# Patient Record
Sex: FEMALE | Race: WHITE | NOT HISPANIC OR LATINO | Employment: STUDENT | URBAN - METROPOLITAN AREA
[De-identification: names, ages, dates, MRNs, and addresses within clinical notes are randomized per-mention and may not be internally consistent; named-entity substitution may affect disease eponyms.]

---
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
## 2024-01-08 ENCOUNTER — TELEPHONE (OUTPATIENT)
Dept: OBGYN CLINIC | Facility: CLINIC | Age: 19
End: 2024-01-08

## 2024-01-08 NOTE — TELEPHONE ENCOUNTER
Giuliana,pts mother asking if Sirisha may keep 01/12/24 WA,if starts menses. Left message on Giuliana's voice mail, may keep Friday's appt.,if having light bleeding.

## 2024-01-11 ENCOUNTER — TELEPHONE (OUTPATIENT)
Dept: OBGYN CLINIC | Facility: CLINIC | Age: 19
End: 2024-01-11

## 2024-01-11 DIAGNOSIS — Z30.41 ENCOUNTER FOR SURVEILLANCE OF CONTRACEPTIVE PILLS: Primary | ICD-10-CM

## 2024-01-11 RX ORDER — NORETHINDRONE ACETATE AND ETHINYL ESTRADIOL AND FERROUS FUMARATE 1MG-20(24)
1 KIT ORAL DAILY
Qty: 84 TABLET | Refills: 1 | Status: SHIPPED | OUTPATIENT
Start: 2024-01-11 | End: 2024-01-17 | Stop reason: ALTCHOICE

## 2024-01-11 NOTE — TELEPHONE ENCOUNTER
1/11/24-Pt had WA scheduled tomorrow 1/12/24 with Dr. Otto that had to be CX due to Dr. Otto being pulled to on call at the hospital. Her appt has been RS to 1/17/24 with Dr. Otto, but she needs per Rx refilled, she will be out as of Sun 1/14/24. Her pharmacy is [facility], as noted in her chart. Thank you.

## 2024-01-17 ENCOUNTER — ANNUAL EXAM (OUTPATIENT)
Dept: OBGYN CLINIC | Facility: CLINIC | Age: 19
End: 2024-01-17
Payer: COMMERCIAL

## 2024-01-17 VITALS
DIASTOLIC BLOOD PRESSURE: 80 MMHG | WEIGHT: 138 LBS | BODY MASS INDEX: 22.18 KG/M2 | SYSTOLIC BLOOD PRESSURE: 116 MMHG | HEIGHT: 66 IN

## 2024-01-17 DIAGNOSIS — Z01.419 ENCNTR FOR GYN EXAM (GENERAL) (ROUTINE) W/O ABN FINDINGS: Primary | ICD-10-CM

## 2024-01-17 DIAGNOSIS — Z30.41 ENCOUNTER FOR SURVEILLANCE OF CONTRACEPTIVE PILLS: ICD-10-CM

## 2024-01-17 PROCEDURE — 99395 PREV VISIT EST AGE 18-39: CPT | Performed by: OBSTETRICS & GYNECOLOGY

## 2024-01-17 RX ORDER — NORETHINDRONE ACETATE AND ETHINYL ESTRADIOL 1MG-20(21)
1 KIT ORAL DAILY
Qty: 84 TABLET | Refills: 3 | Status: SHIPPED | OUTPATIENT
Start: 2024-01-17 | End: 2024-12-18

## 2024-01-17 NOTE — PROGRESS NOTES
Annual Wellness Visit  [facility] - [address], [address], [place] [address]    ASSESSMENT/PLAN: Sirisha Leal is a 18 y.o.  who presents for annual gynecologic exam.    Encounter for routine gynecologic examination  - Routine well woman exam completed today.  - HPV Vaccination status: Immunization series complete  - Plan routine Cervical Cancer screening starting at age 20yo.  - STI screening offered including HIV testing: offered, pt declined  - Contraceptive counseling discussed.  Current contraception: oral contraceptives (estrogen/progesterone)  - The following were reviewed in today's visit: breast self exam and use and side effects of OCPs    Additional problems addressed during this visit:  1. Encntr for gyn exam (general) (routine) w/o abn findings    2. Encounter for surveillance of contraceptive pills        Next visit: 1 yr      CC:  Annual Gynecologic Examination    HPI: Sirisha Leal is a 18 y.o.  who presents for annual gynecologic examination.  Patient presents for Gyn exam with mom.  Had headaches which resolved spontaneously when home from college.  Desires to continue with OCPs.  SA, declines GC/CT culture        Gyn History  Patient's last menstrual period was 2024.       She  reports being sexually active and has had partner(s) who are male. She reports using the following method of birth control/protection: Other.       OB History      No past medical history on file.     Past Surgical History:  2023: WISDOM TOOTH EXTRACTION     Family History   Problem Relation Age of Onset    Migraines Mother     Breast cancer Neg Hx     Uterine cancer Neg Hx     Ovarian cancer Neg Hx     Colon cancer Neg Hx         Social History     Tobacco Use    Smoking status: Never    Smokeless tobacco: Never   Vaping Use    Vaping status: Never Used   Substance Use Topics    Alcohol use: Never    Drug use: Never          Current Outpatient  "Medications:     norethindrone-ethinyl estradiol (JUNEL FE 1/20) 1-20 MG-MCG per tablet, Take 1 tablet by mouth daily, Disp: , Rfl:     norethindrone-ethinyl estradiol-ferrous fumarate (Junel Fe 24) 1-20 MG-MCG(24) per tablet, Take 1 tablet by mouth daily, Disp: 84 tablet, Rfl: 3    norethindrone-ethinyl estradiol-ferrous fumarate (Junel Fe 24) 1-20 MG-MCG(24) per tablet, Take 1 tablet by mouth daily (Patient not taking: Reported on 1/17/2024), Disp: 28 tablet, Rfl: 1    norethindrone-ethinyl estradiol-ferrous fumarate (Junel Fe 24) 1-20 MG-MCG(24) per tablet, Take 1 tablet by mouth daily (Patient not taking: Reported on 1/17/2024), Disp: 84 tablet, Rfl: 1    She has No Known Allergies..    ROS negative except as noted in HPI    Objective:  /80 (BP Location: Right arm, Patient Position: Sitting, Cuff Size: Standard)   Ht 5' 5.5\" (1.664 m)   Wt 62.6 kg (138 lb)   LMP 01/13/2024   BMI 22.62 kg/m²      Physical Exam  Vitals and nursing note reviewed.   HENT:      Head: Normocephalic.   Chest:   Breasts:     Breasts are symmetrical.      Right: Normal. No bleeding, mass, nipple discharge, skin change or tenderness.      Left: Normal. No bleeding, mass, nipple discharge, skin change or tenderness.   Abdominal:      General: There is no distension.      Palpations: Abdomen is soft. There is no mass.      Tenderness: There is no abdominal tenderness. There is no rebound.   Genitourinary:     General: Normal vulva.      Exam position: Lithotomy position.      Labia:         Right: No rash, tenderness or lesion.         Left: No rash, tenderness or lesion.       Urethra: No urethral pain or urethral lesion.      Vagina: Normal. No vaginal discharge.      Cervix: No discharge, friability, lesion or erythema.      Uterus: Normal.       Adnexa: Right adnexa normal and left adnexa normal.      Rectum: No external hemorrhoid.   Musculoskeletal:      Right lower leg: No edema.      Left lower leg: No edema. "   Lymphadenopathy:      Upper Body:      Right upper body: No axillary or pectoral adenopathy.      Left upper body: No axillary or pectoral adenopathy.   Skin:     General: Skin is warm.   Neurological:      Mental Status: She is alert and oriented to person, place, and time.   Psychiatric:         Mood and Affect: Mood normal.         Behavior: Behavior normal.         Thought Content: Thought content normal.

## 2025-01-30 DIAGNOSIS — Z30.41 ENCOUNTER FOR SURVEILLANCE OF CONTRACEPTIVE PILLS: ICD-10-CM

## 2025-01-30 RX ORDER — NORETHINDRONE ACETATE AND ETHINYL ESTRADIOL 1MG-20(21)
1 KIT ORAL DAILY
Qty: 84 TABLET | Refills: 0 | Status: SHIPPED | OUTPATIENT
Start: 2025-01-30

## 2025-03-14 ENCOUNTER — ANNUAL EXAM (OUTPATIENT)
Dept: OBGYN CLINIC | Facility: CLINIC | Age: 20
End: 2025-03-14
Payer: COMMERCIAL

## 2025-03-14 VITALS
BODY MASS INDEX: 22.08 KG/M2 | HEIGHT: 66 IN | WEIGHT: 137.4 LBS | SYSTOLIC BLOOD PRESSURE: 110 MMHG | DIASTOLIC BLOOD PRESSURE: 72 MMHG

## 2025-03-14 DIAGNOSIS — Z12.4 CERVICAL CANCER SCREENING: ICD-10-CM

## 2025-03-14 DIAGNOSIS — Z11.3 ROUTINE SCREENING FOR STI (SEXUALLY TRANSMITTED INFECTION): ICD-10-CM

## 2025-03-14 DIAGNOSIS — Z30.41 ENCOUNTER FOR SURVEILLANCE OF CONTRACEPTIVE PILLS: ICD-10-CM

## 2025-03-14 DIAGNOSIS — Z01.419 ENCOUNTER FOR ANNUAL ROUTINE GYNECOLOGICAL EXAMINATION: Primary | ICD-10-CM

## 2025-03-14 PROCEDURE — S0612 ANNUAL GYNECOLOGICAL EXAMINA: HCPCS | Performed by: OBSTETRICS & GYNECOLOGY

## 2025-03-14 RX ORDER — NORETHINDRONE ACETATE AND ETHINYL ESTRADIOL 1MG-20(21)
1 KIT ORAL DAILY
Qty: 84 TABLET | Refills: 4 | Status: SHIPPED | OUTPATIENT
Start: 2025-03-14

## 2025-03-14 NOTE — LETTER
2025     DANN Verdugo  [address] [address]    Patient: Sirisha Leal   YOB: 2005   Date of Visit: 3/14/2025       Dear Dr. Christianson:    Thank you for referring Sirisha Leal to me for evaluation. Below are my notes for this consultation.    If you have questions, please do not hesitate to call me. I look forward to following your patient along with you.         Sincerely,        Desiree Mak MD        CC: No Recipients    Desiree Mak MD  3/14/2025  9:47 AM  Sign when Signing Visit  Annual Wellness Visit  [facility] [address], [address], [place] [address]    ASSESSMENT/PLAN: Sirisha Leal is a 20 y.o.  who presents for annual gynecologic exam.    Assessment & Plan  Encounter for annual routine gynecological examination  - Routine well woman exam completed today.  - Cervical Cancer Screening: Current ASCCP Guidelines reviewed. Last Pap: not yet done.  Past abnormal pap: normal.  Next Pap Due: today.  - HPV Vaccination status: Immunization series complete  - STI screening offered including HIV testing: GC/CT done, others declined  - Contraceptive counseling discussed.  Current contraception: condoms, oral contraceptives (estrogen/progesterone),   - The following were reviewed in today's visit: STD testing, use and side effects of OCPs, exercise, and healthy diet       Cervical cancer screening    Orders:  •  IGP, CtNg, rfx Aptima HPV ASCU    Routine screening for STI (sexually transmitted infection)    Orders:  •  IGP, CtNg, rfx Aptima HPV ASCU    Encounter for surveillance of contraceptive pills  No contraindication to estrogen/progesterone birth control use identified. Reviewed that only condoms protect against STIs. Refilled for 1 year.    Orders:  •  norethindrone-ethinyl estradiol (JUNEL FE 1/20) 1-20 MG-MCG per tablet; Take 1 tablet by mouth daily      Next visit: 1 year Wellness      CC:  Annual  "Gynecologic Examination    HPI: Sirisha Leal is a 20 y.o.  who presents for annual gynecologic examination.  She denies any breast, urinary or pelvic issues at today's visit.  Presents with Mom, Amparo.  Talked with patient privately as well.    Periods monthly with OCPs.  No issues.  Sexually active, new partner in last year.  Condoms, OCPs.          Gyn History  Patient's last menstrual period was 2025.     Last Pap: not yet done    She  reports being sexually active and has had partner(s) who are male. She reports using the following method of birth control/protection: Other.       OB History      No past medical history on file.     Past Surgical History:  2023: WISDOM TOOTH EXTRACTION     Family History   Problem Relation Age of Onset   • Migraines Mother    • Breast cancer Neg Hx    • Uterine cancer Neg Hx    • Ovarian cancer Neg Hx    • Colon cancer Neg Hx         Social History     Tobacco Use   • Smoking status: Never   • Smokeless tobacco: Never   Vaping Use   • Vaping status: Never Used   Substance Use Topics   • Alcohol use: Never   • Drug use: Never          Current Outpatient Medications:   •  norethindrone-ethinyl estradiol (JUNEL FE 1/20) 1-20 MG-MCG per tablet, TAKE ONE TABLET BY MOUTH EVERY DAY, Disp: 84 tablet, Rfl: 0    She has no known allergies..    ROS negative except as noted in HPI    Objective:  /72 (BP Location: Left arm, Patient Position: Sitting, Cuff Size: Standard)   Ht 5' 5.5\" (1.664 m)   Wt 62.3 kg (137 lb 6.4 oz)   LMP 2025   BMI 22.52 kg/m²      Physical Exam  Constitutional:       Appearance: Normal appearance.   Chest:   Breasts:     Right: Normal. No mass or tenderness.      Left: Normal. No mass or tenderness.   Abdominal:      Palpations: Abdomen is soft.      Tenderness: There is no abdominal tenderness.   Genitourinary:     General: Normal vulva.      Vagina: No bleeding or lesions.      Cervix: Normal.      Uterus: " Normal. Not tender.       Adnexa:         Right: No mass or tenderness.          Left: No mass or tenderness.        Rectum: No external hemorrhoid.   Musculoskeletal:         General: Normal range of motion.   Lymphadenopathy:      Upper Body:      Right upper body: No axillary adenopathy.      Left upper body: No axillary adenopathy.   Neurological:      Mental Status: She is alert and oriented to person, place, and time.   Psychiatric:         Mood and Affect: Mood normal.         Behavior: Behavior normal.

## 2025-03-14 NOTE — PROGRESS NOTES
Annual Wellness Visit  [facility] - [address], [address], [place] [address]    ASSESSMENT/PLAN: Sirisha Leal is a 20 y.o.  who presents for annual gynecologic exam.    Assessment & Plan  Encounter for annual routine gynecological examination  - Routine well woman exam completed today.  - Cervical Cancer Screening: Current ASCCP Guidelines reviewed. Last Pap: not yet done.  Past abnormal pap: normal.  Next Pap Due: today.  - HPV Vaccination status: Immunization series complete  - STI screening offered including HIV testing: GC/CT done, others declined  - Contraceptive counseling discussed.  Current contraception: condoms, oral contraceptives (estrogen/progesterone),   - The following were reviewed in today's visit: STD testing, use and side effects of OCPs, exercise, and healthy diet       Cervical cancer screening    Orders:    IGP, CtNg, rfx Aptima HPV ASCU    Routine screening for STI (sexually transmitted infection)    Orders:    IGP, CtNg, rfx Aptima HPV ASCU    Encounter for surveillance of contraceptive pills  No contraindication to estrogen/progesterone birth control use identified. Reviewed that only condoms protect against STIs. Refilled for 1 year.    Orders:    norethindrone-ethinyl estradiol (JUNEL FE 1/20) 1-20 MG-MCG per tablet; Take 1 tablet by mouth daily      Next visit: 1 year Wellness      CC:  Annual Gynecologic Examination    HPI: Sirisha Leal is a 20 y.o.  who presents for annual gynecologic examination.  She denies any breast, urinary or pelvic issues at today's visit.  Presents with Mom, Amparo.  Talked with patient privately as well.    Periods monthly with OCPs.  No issues.  Sexually active, new partner in last year.  Condoms, OCPs.          Gyn History  Patient's last menstrual period was 2025.     Last Pap: not yet done    She  reports being sexually active and has had partner(s) who are male. She reports using  "the following method of birth control/protection: Other.       OB History      No past medical history on file.     Past Surgical History:  2023: WISDOM TOOTH EXTRACTION     Family History   Problem Relation Age of Onset    Migraines Mother     Breast cancer Neg Hx     Uterine cancer Neg Hx     Ovarian cancer Neg Hx     Colon cancer Neg Hx         Social History     Tobacco Use    Smoking status: Never    Smokeless tobacco: Never   Vaping Use    Vaping status: Never Used   Substance Use Topics    Alcohol use: Never    Drug use: Never          Current Outpatient Medications:     norethindrone-ethinyl estradiol (JUNEL FE 1/20) 1-20 MG-MCG per tablet, TAKE ONE TABLET BY MOUTH EVERY DAY, Disp: 84 tablet, Rfl: 0    She has no known allergies..    ROS negative except as noted in HPI    Objective:  /72 (BP Location: Left arm, Patient Position: Sitting, Cuff Size: Standard)   Ht 5' 5.5\" (1.664 m)   Wt 62.3 kg (137 lb 6.4 oz)   LMP 2025   BMI 22.52 kg/m²      Physical Exam  Constitutional:       Appearance: Normal appearance.   Chest:   Breasts:     Right: Normal. No mass or tenderness.      Left: Normal. No mass or tenderness.   Abdominal:      Palpations: Abdomen is soft.      Tenderness: There is no abdominal tenderness.   Genitourinary:     General: Normal vulva.      Vagina: No bleeding or lesions.      Cervix: Normal.      Uterus: Normal. Not tender.       Adnexa:         Right: No mass or tenderness.          Left: No mass or tenderness.        Rectum: No external hemorrhoid.   Musculoskeletal:         General: Normal range of motion.   Lymphadenopathy:      Upper Body:      Right upper body: No axillary adenopathy.      Left upper body: No axillary adenopathy.   Neurological:      Mental Status: She is alert and oriented to person, place, and time.   Psychiatric:         Mood and Affect: Mood normal.         Behavior: Behavior normal.         "

## 2025-03-18 LAB
C TRACH RRNA CVX QL NAA+PROBE: NEGATIVE
CYTOLOGIST CVX/VAG CYTO: NORMAL
DX ICD CODE: NORMAL
N GONORRHOEA RRNA CVX QL NAA+PROBE: NEGATIVE
OTHER STN SPEC: NORMAL
OTHER STN SPEC: NORMAL
PATH REPORT.FINAL DX SPEC: NORMAL
SL AMB NOTE:: NORMAL
SL AMB SPECIMEN ADEQUACY: NORMAL
SL AMB TEST METHODOLOGY: NORMAL

## 2025-03-31 ENCOUNTER — RESULTS FOLLOW-UP (OUTPATIENT)
Dept: OBGYN CLINIC | Facility: CLINIC | Age: 20
End: 2025-03-31

## 2025-07-27 ENCOUNTER — OFFICE VISIT (OUTPATIENT)
Age: 20
End: 2025-07-27

## 2025-07-27 VITALS
SYSTOLIC BLOOD PRESSURE: 111 MMHG | OXYGEN SATURATION: 99 % | HEART RATE: 68 BPM | DIASTOLIC BLOOD PRESSURE: 73 MMHG | TEMPERATURE: 97.4 F | RESPIRATION RATE: 20 BRPM | BODY MASS INDEX: 23.51 KG/M2 | HEIGHT: 65 IN | WEIGHT: 141.09 LBS

## 2025-07-27 DIAGNOSIS — L01.00 IMPETIGO: Primary | ICD-10-CM

## 2025-07-27 PROCEDURE — PBNCHG PB NO CHARGE PLACEHOLDER: Performed by: EMERGENCY MEDICINE

## 2025-07-27 RX ORDER — NORETHINDRONE ACETATE AND ETHINYL ESTRADIOL 1MG-20(21)
1 KIT ORAL DAILY
COMMUNITY

## 2025-07-27 RX ORDER — MUPIROCIN 2 %
OINTMENT (GRAM) TOPICAL 2 TIMES DAILY
Qty: 22 G | Refills: 1 | Status: SHIPPED | OUTPATIENT
Start: 2025-07-27